# Patient Record
Sex: MALE | Race: WHITE | NOT HISPANIC OR LATINO | Employment: STUDENT | URBAN - METROPOLITAN AREA
[De-identification: names, ages, dates, MRNs, and addresses within clinical notes are randomized per-mention and may not be internally consistent; named-entity substitution may affect disease eponyms.]

---
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
## 2024-01-26 ENCOUNTER — TELEPHONE (OUTPATIENT)
Dept: FAMILY MEDICINE CLINIC | Facility: CLINIC | Age: 19
End: 2024-01-26

## 2024-01-26 NOTE — TELEPHONE ENCOUNTER
Pt contacted. Was unable to get today appt. Told pt we are open on Saturday 9-12 acute care if he was interested. If not to go to [facility] or er

## 2024-01-26 NOTE — TELEPHONE ENCOUNTER
Patient was calling today because he was having cough, drainage and sinus pressure.  I informed him that unfortunately we had no openings as of right now and advised him that he may need to go to [facility] to be evaluated.  If he can be worked in he would appreciate it but I told him there were no guarantees .

## 2024-03-09 ENCOUNTER — OFFICE VISIT (OUTPATIENT)
Dept: FAMILY MEDICINE CLINIC | Facility: CLINIC | Age: 19
End: 2024-03-09
Payer: COMMERCIAL

## 2024-03-09 VITALS
OXYGEN SATURATION: 99 % | SYSTOLIC BLOOD PRESSURE: 110 MMHG | HEART RATE: 72 BPM | WEIGHT: 154 LBS | DIASTOLIC BLOOD PRESSURE: 74 MMHG | BODY MASS INDEX: 22.05 KG/M2 | HEIGHT: 70 IN

## 2024-03-09 DIAGNOSIS — J40 BRONCHITIS: Primary | ICD-10-CM

## 2024-03-09 PROCEDURE — 99213 OFFICE O/P EST LOW 20 MIN: CPT | Performed by: STUDENT IN AN ORGANIZED HEALTH CARE EDUCATION/TRAINING PROGRAM

## 2024-03-09 RX ORDER — AMOXICILLIN 875 MG/1
875 TABLET, COATED ORAL 2 TIMES DAILY
Qty: 14 TABLET | Refills: 0 | Status: SHIPPED | OUTPATIENT
Start: 2024-03-09 | End: 2024-03-16

## 2024-03-09 RX ORDER — DEXTROMETHORPHAN HYDROBROMIDE AND PROMETHAZINE HYDROCHLORIDE 15; 6.25 MG/5ML; MG/5ML
5 SYRUP ORAL 4 TIMES DAILY PRN
Qty: 240 ML | Refills: 0 | Status: SHIPPED | OUTPATIENT
Start: 2024-03-09

## 2024-03-09 NOTE — PROGRESS NOTES
"Chief Complaint  Cough (Pt reports cough x 1 week with yellowish green sputum. )    Subjective          Piero Barlow presents to [facility]  Cough  This is a new problem. The current episode started 1 to 4 weeks ago. The problem has been worse. The problem occurs constantly. The cough is Productive of sputum. Associated symptoms include headaches, nasal congestion, postnasal drip and rhinorrhea. Pertinent negatives include no ear congestion, fever, sore throat or shortness of breath. Nothing aggravates the symptoms. He has tried OTC cough suppressant for the symptoms. The treatment provided mild relief. His past medical history is significant for environmental allergies.       Objective   Vital Signs:   /74   Pulse 72   Ht 177.8 cm (70\")   Wt 69.9 kg (154 lb)   SpO2 99%   BMI 22.10 kg/m²     Body mass index is 22.1 kg/m².    Review of Systems   Constitutional:  Negative for fever.   HENT:  Positive for postnasal drip and rhinorrhea. Negative for sore throat.    Respiratory:  Positive for cough. Negative for shortness of breath.    Allergic/Immunologic: Positive for environmental allergies.       Past History:  Medical History: has no past medical history on file.   Surgical History: has no past surgical history on file.   Family History: family history is not on file.   Social History: reports that he has never smoked. He has never used smokeless tobacco. He reports that he does not drink alcohol and does not use drugs.      Current Outpatient Medications:     fluticasone (FLONASE) 50 MCG/ACT nasal spray, Instill 2 sprays into the nostril(s) as directed by provider Daily., Disp: 16 mL, Rfl: 3    amoxicillin (AMOXIL) 875 MG tablet, Take 1 tablet by mouth 2 (Two) Times a Day for 7 days., Disp: 14 tablet, Rfl: 0    promethazine-dextromethorphan (PROMETHAZINE-DM) 6.25-15 MG/5ML syrup, Take 5 mL by mouth 4 (Four) Times a Day As Needed for Cough., Disp: 240 mL, Rfl: " 0    Allergies: Patient has no known allergies.    Physical Exam  Constitutional:       General: He is not in acute distress.     Appearance: He is not ill-appearing or toxic-appearing.   HENT:      Head: Normocephalic and atraumatic.      Right Ear: Ear canal and external ear normal.      Left Ear: Ear canal and external ear normal.      Nose: Congestion and rhinorrhea present.      Mouth/Throat:      Pharynx: Posterior oropharyngeal erythema (cobblestoning) present.   Eyes:      General: No scleral icterus.  Cardiovascular:      Rate and Rhythm: Normal rate and regular rhythm.   Pulmonary:      Effort: Pulmonary effort is normal.      Breath sounds: Normal breath sounds.   Neurological:      Mental Status: He is alert.        Result Review :                   Assessment and Plan    Diagnoses and all orders for this visit:    1. Bronchitis (Primary)    Other orders  -     amoxicillin (AMOXIL) 875 MG tablet; Take 1 tablet by mouth 2 (Two) Times a Day for 7 days.  Dispense: 14 tablet; Refill: 0  -     promethazine-dextromethorphan (PROMETHAZINE-DM) 6.25-15 MG/5ML syrup; Take 5 mL by mouth 4 (Four) Times a Day As Needed for Cough.  Dispense: 240 mL; Refill: 0    Will treat with Amoxicillin and Cough meds. Tylenol/Motrin for pain/fever. OTC cough and cold medication for symptoms. Nasal saline spray recommended. Cool mist humidifier at the bedside. RTC with new or worsening symptoms.      Follow Up   No follow-ups on file.  Patient was given instructions and counseling regarding his condition or for health maintenance advice. Please see specific information pulled into the AVS if appropriate.     Lee Ann Tijerina,

## 2024-04-10 ENCOUNTER — TELEPHONE (OUTPATIENT)
Dept: FAMILY MEDICINE CLINIC | Facility: CLINIC | Age: 19
End: 2024-04-10
Payer: COMMERCIAL

## 2024-04-10 ENCOUNTER — HOSPITAL ENCOUNTER (EMERGENCY)
Facility: HOSPITAL | Age: 19
Discharge: HOME | End: 2024-04-10
Attending: EMERGENCY MEDICINE | Admitting: EMERGENCY MEDICINE
Payer: COMMERCIAL

## 2024-04-10 ENCOUNTER — APPOINTMENT (OUTPATIENT)
Dept: CT IMAGING | Facility: HOSPITAL | Age: 19
End: 2024-04-10
Payer: COMMERCIAL

## 2024-04-10 VITALS
SYSTOLIC BLOOD PRESSURE: 118 MMHG | WEIGHT: 155 LBS | HEART RATE: 57 BPM | TEMPERATURE: 98.1 F | RESPIRATION RATE: 16 BRPM | OXYGEN SATURATION: 100 % | HEIGHT: 69 IN | DIASTOLIC BLOOD PRESSURE: 82 MMHG | BODY MASS INDEX: 22.96 KG/M2

## 2024-04-10 DIAGNOSIS — S06.0X0A CONCUSSION WITHOUT LOSS OF CONSCIOUSNESS, INITIAL ENCOUNTER: Primary | ICD-10-CM

## 2024-04-10 DIAGNOSIS — S09.90XA INJURY OF HEAD, INITIAL ENCOUNTER: ICD-10-CM
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
## 2024-04-10 PROCEDURE — 70450 CT HEAD/BRAIN W/O DYE: CPT

## 2024-04-10 PROCEDURE — 99284 EMERGENCY DEPT VISIT MOD MDM: CPT
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
## 2024-04-15 ENCOUNTER — OFFICE VISIT (OUTPATIENT)
Dept: FAMILY MEDICINE CLINIC | Facility: CLINIC | Age: 19
End: 2024-04-15
Payer: COMMERCIAL

## 2024-04-15 VITALS
DIASTOLIC BLOOD PRESSURE: 68 MMHG | SYSTOLIC BLOOD PRESSURE: 104 MMHG | BODY MASS INDEX: 23.11 KG/M2 | HEIGHT: 69 IN | OXYGEN SATURATION: 99 % | WEIGHT: 156 LBS | HEART RATE: 58 BPM
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
## 2024-04-15 DIAGNOSIS — S06.0XAD CONCUSSION WITH UNKNOWN LOSS OF CONSCIOUSNESS STATUS, SUBSEQUENT ENCOUNTER: Primary | ICD-10-CM
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
## 2024-04-19 ENCOUNTER — DOCUMENTATION (OUTPATIENT)
Dept: FAMILY MEDICINE CLINIC | Facility: CLINIC | Age: 19
End: 2024-04-19
Payer: COMMERCIAL

## 2024-04-19 NOTE — PROGRESS NOTES
Patient stopped by [facility] today.  He states that he has been feeling much better.  He states that he has had no further nausea or tiredness.  He states that he still has some fatigue but notes it is his normal college fatigue.  He states that he has been back to class as well as back to getting his homework done with no increase in symptoms.  He has had 1 headache that was really in the back of his head the other night but otherwise has been doing fine.  He states that he does have concussion forms to fill out daily but has been forgetting to do them.  He has not been back to track practice just yet.  Advised patient to continue current remember to be easy with himself this week.  Will worry about resuming track practice until next week.  If he has any return of symptoms he is to let our office know immediately.

## 2024-08-09 ENCOUNTER — OFFICE VISIT (OUTPATIENT)
Dept: FAMILY MEDICINE CLINIC | Facility: CLINIC | Age: 19
End: 2024-08-09
Payer: COMMERCIAL

## 2024-08-09 VITALS
WEIGHT: 156 LBS | DIASTOLIC BLOOD PRESSURE: 76 MMHG | OXYGEN SATURATION: 99 % | HEART RATE: 58 BPM | BODY MASS INDEX: 23.11 KG/M2 | SYSTOLIC BLOOD PRESSURE: 132 MMHG | HEIGHT: 69 IN

## 2024-08-09 DIAGNOSIS — Z11.59 NEED FOR HEPATITIS C SCREENING TEST: ICD-10-CM

## 2024-08-09 DIAGNOSIS — Z00.00 ANNUAL PHYSICAL EXAM: Primary | ICD-10-CM

## 2024-08-09 PROCEDURE — 99395 PREV VISIT EST AGE 18-39: CPT | Performed by: PHYSICIAN ASSISTANT

## 2024-08-09 NOTE — PROGRESS NOTES
"Chief Complaint  Annual Exam    Subjective          Piero Balrow presents to [facility] for   History of Present Illness  .  History of Present Illness  The patient presents for a physical examination for [employer] student athlete form    He forgot to complete the necessary physical form for his school. He experienced hip pain at work, which he suspects might be a hip flexor strain. The pain was so severe that he could barely raise his leg. After a week of rest, the pain subsided, and he no longer experiences discomfort while walking. He has taken ibuprofen a few times. He has no history of asthma. He has a dermatology appointment scheduled for next Monday to address his acne.        Objective   Vital Signs:   Vitals:    08/09/24 1447   BP: 132/76   Pulse: 58   SpO2: 99%   Weight: 70.8 kg (156 lb)   Height: 175.3 cm (69\")     Body mass index is 23.04 kg/m².  BMI is within normal parameters. No other follow-up for BMI required.      History reviewed. No pertinent family history.    Social History     Tobacco Use   • Smoking status: Never   • Smokeless tobacco: Never   Substance Use Topics   • Alcohol use: Never       History reviewed. No pertinent surgical history.    Review of Systems      Physical Exam  Vitals and nursing note reviewed.   Constitutional:       General: He is not in acute distress.     Appearance: Normal appearance. He is normal weight. He is not ill-appearing or toxic-appearing.   HENT:      Head: Normocephalic and atraumatic.      Right Ear: Tympanic membrane, ear canal and external ear normal.      Left Ear: Tympanic membrane, ear canal and external ear normal.      Nose: Nose normal.      Mouth/Throat:      Mouth: Mucous membranes are moist.   Eyes:      General: No scleral icterus.     Extraocular Movements: Extraocular movements intact.      Pupils: Pupils are equal, round, and reactive to light.   Cardiovascular:      Rate and Rhythm: Normal rate and " regular rhythm.      Heart sounds: Normal heart sounds.   Pulmonary:      Effort: Pulmonary effort is normal.      Breath sounds: Normal breath sounds.   Abdominal:      General: Abdomen is flat.   Musculoskeletal:         General: Normal range of motion.      Cervical back: Normal range of motion.   Skin:     General: Skin is warm.   Neurological:      General: No focal deficit present.      Mental Status: He is alert.      Cranial Nerves: No cranial nerve deficit.      Motor: No weakness.      Coordination: Coordination normal.      Gait: Gait normal.      Deep Tendon Reflexes: Reflexes normal.   Psychiatric:         Mood and Affect: Mood normal.         Behavior: Behavior normal.      Result Review :                Immunization History   Administered Date(s) Administered   • Covid-19 (Pfizer) Gray Cap Monovalent 08/10/2022, 10/05/2022   • DTaP, Unspecified 06/18/2008   • Hep A, 2 Dose 06/29/2012   • Hpv9 08/10/2022   • IPV 06/18/2008   • Influenza Quad Vaccine (Inpatient) 10/17/2007, 10/30/2008, 09/12/2009, 10/08/2012   • Influenza, Unspecified 10/18/2017   • MMR 06/18/2008   • Meningococcal Conjugate 08/10/2022   • Meningococcal MCV4P (Menactra) 10/26/2015   • PEDS-Pneumococcal Conjugate (PCV7) 06/14/2005   • Tdap 10/26/2015   • Varicella 06/14/2005, 06/18/2008, 06/29/2012       Health Maintenance   Topic Date Due   • HPV VACCINES (2 - Male 3-dose series) 09/07/2022   • HEPATITIS C SCREENING  Never done   • ANNUAL PHYSICAL  Never done   • COVID-19 Vaccine (3 - 2023-24 season) 09/01/2023   • INFLUENZA VACCINE  08/01/2024   • TDAP/TD VACCINES (2 - Td or Tdap) 10/26/2025   • MENINGOCOCCAL VACCINE  Completed   • Pneumococcal Vaccine 0-64  Aged Out         .  Assessment & Plan  1. Annual physical examination.  His blood pressure readings are within the normal range. Blood work will be conducted to rule out anemia. He is advised to bring his physical form to the next visit for completion.         Assessment and Plan     Diagnoses and all orders for this visit:    1. Annual physical exam (Primary)    2. Need for hepatitis C screening test        Counseling/anticipatory guidance: Nutrition, physical activity, healthy weight, dental health, mental health, eye exam, immunizations, screenings      Follow Up   No follow-ups on file.  Patient was given instructions and counseling regarding his condition or for health maintenance advice. Please see specific information pulled into the AVS if appropriate.

## 2024-08-10 LAB
25(OH)D3+25(OH)D2 SERPL-MCNC: 93.7 NG/ML (ref 30–100)
ALBUMIN SERPL-MCNC: 4.9 G/DL (ref 4.3–5.2)
ALP SERPL-CCNC: 84 IU/L (ref 51–125)
ALT SERPL-CCNC: 14 IU/L (ref 0–44)
AST SERPL-CCNC: 19 IU/L (ref 0–40)
BASOPHILS # BLD AUTO: 0.1 X10E3/UL (ref 0–0.2)
BASOPHILS NFR BLD AUTO: 1 %
BILIRUB SERPL-MCNC: 0.5 MG/DL (ref 0–1.2)
BUN SERPL-MCNC: 17 MG/DL (ref 6–20)
BUN/CREAT SERPL: 19 (ref 9–20)
CALCIUM SERPL-MCNC: 9.7 MG/DL (ref 8.7–10.2)
CHLORIDE SERPL-SCNC: 102 MMOL/L (ref 96–106)
CO2 SERPL-SCNC: 23 MMOL/L (ref 20–29)
CREAT SERPL-MCNC: 0.9 MG/DL (ref 0.76–1.27)
EGFRCR SERPLBLD CKD-EPI 2021: 125 ML/MIN/1.73
EOSINOPHIL # BLD AUTO: 0.1 X10E3/UL (ref 0–0.4)
EOSINOPHIL NFR BLD AUTO: 1 %
ERYTHROCYTE [DISTWIDTH] IN BLOOD BY AUTOMATED COUNT: 12.8 % (ref 11.6–15.4)
GLOBULIN SER CALC-MCNC: 2.2 G/DL (ref 1.5–4.5)
GLUCOSE SERPL-MCNC: 85 MG/DL (ref 70–99)
HCT VFR BLD AUTO: 45.7 % (ref 37.5–51)
HGB BLD-MCNC: 14.9 G/DL (ref 13–17.7)
IMM GRANULOCYTES # BLD AUTO: 0 X10E3/UL (ref 0–0.1)
IMM GRANULOCYTES NFR BLD AUTO: 0 %
LYMPHOCYTES # BLD AUTO: 2.3 X10E3/UL (ref 0.7–3.1)
LYMPHOCYTES NFR BLD AUTO: 31 %
MCH RBC QN AUTO: 29.9 PG (ref 26.6–33)
MCHC RBC AUTO-ENTMCNC: 32.6 G/DL (ref 31.5–35.7)
MCV RBC AUTO: 92 FL (ref 79–97)
MONOCYTES # BLD AUTO: 0.4 X10E3/UL (ref 0.1–0.9)
MONOCYTES NFR BLD AUTO: 5 %
NEUTROPHILS # BLD AUTO: 4.6 X10E3/UL (ref 1.4–7)
NEUTROPHILS NFR BLD AUTO: 62 %
PLATELET # BLD AUTO: 211 X10E3/UL (ref 150–450)
POTASSIUM SERPL-SCNC: 4.1 MMOL/L (ref 3.5–5.2)
PROT SERPL-MCNC: 7.1 G/DL (ref 6–8.5)
RBC # BLD AUTO: 4.98 X10E6/UL (ref 4.14–5.8)
SODIUM SERPL-SCNC: 140 MMOL/L (ref 134–144)
T4 FREE SERPL-MCNC: 1.34 NG/DL (ref 0.82–1.77)
TSH SERPL DL<=0.005 MIU/L-ACNC: 1.21 UIU/ML (ref 0.45–4.5)
VIT B12 SERPL-MCNC: 734 PG/ML (ref 232–1245)
WBC # BLD AUTO: 7.4 X10E3/UL (ref 3.4–10.8)

## 2024-08-12 ENCOUNTER — TELEPHONE (OUTPATIENT)
Dept: FAMILY MEDICINE CLINIC | Facility: CLINIC | Age: 19
End: 2024-08-12
Payer: COMMERCIAL

## 2024-08-12 NOTE — TELEPHONE ENCOUNTER
Name: Piero Barlow    Relationship: Self    Best Callback Number: [phone]    HUB PROVIDED THE RELAY MESSAGE FROM THE OFFICE   PATIENT VOICED UNDERSTANDING AND HAS NO FURTHER QUESTIONS AT THIS TIME    ADDITIONAL INFORMATION:

## 2024-08-12 NOTE — PROGRESS NOTES
Please call patient and let him know that his kidney and liver function as well as electrolytes are normal.  His B12 and his vitamin D levels are also normal.  His thyroid function is normal his blood count is also normal.  I do not have his hepatitis  C back just yet but we will call him when I see that come through my inbox.

## 2024-08-12 NOTE — TELEPHONE ENCOUNTER
HUB TO RELAY: [facility]  for pt to call back.   ----- Message from Samia Yap sent at 8/12/2024  2:31 PM EDT -----  Please call patient and let him know that his kidney and liver function as well as electrolytes are normal.  His B12 and his vitamin D levels are also normal.  His thyroid function is normal his blood count is also normal.  I do not have his hepatiti  s  C back just yet but we will call him when I see that come through my inbox.

## 2024-08-14 ENCOUNTER — TELEPHONE (OUTPATIENT)
Dept: FAMILY MEDICINE CLINIC | Facility: CLINIC | Age: 19
End: 2024-08-14
Payer: COMMERCIAL
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
## 2024-08-15 NOTE — TELEPHONE ENCOUNTER
Please call patient and let him know I've filled it out and he can  or we can fax- whichever is easiest for him

## 2024-09-11 ENCOUNTER — OFFICE VISIT (OUTPATIENT)
Dept: FAMILY MEDICINE CLINIC | Facility: CLINIC | Age: 19
End: 2024-09-11

## 2024-09-11 VITALS
OXYGEN SATURATION: 98 % | DIASTOLIC BLOOD PRESSURE: 72 MMHG | HEIGHT: 69 IN | SYSTOLIC BLOOD PRESSURE: 124 MMHG | HEART RATE: 58 BPM | WEIGHT: 150 LBS | BODY MASS INDEX: 22.22 KG/M2

## 2024-09-11 DIAGNOSIS — T73.3XXA FATIGUE DUE TO EXCESSIVE EXERTION, INITIAL ENCOUNTER: Primary | ICD-10-CM

## 2024-09-11 PROCEDURE — 99213 OFFICE O/P EST LOW 20 MIN: CPT | Performed by: PHYSICIAN ASSISTANT

## 2024-09-11 NOTE — PROGRESS NOTES
".Chief Complaint  Nausea and Fatigue (Started a few days ago )    Subjective          History of Present Illness  Piero Barlow is here today with his  History of Present Illness  The patient presents for evaluation of nausea and motion sickness.  Patient states that over this past weekend he was at a tournament in [place].  They returned at 2 AM in the morning on Sunday with a practice at 9 AM that morning.  Since that time he has been quite tired and fatigued.  He states that he was nauseous in the morning yesterday when he woke.  He states he still had some nausea this morning but less than before.  He has had no vomiting or diarrhea.  He denies any runny nose congestion or cough.  He states that he got a good night sleep last night and has been feeling better today.        Objective   Vital Signs:   /72   Pulse 58   Ht 175.3 cm (69\")   Wt 68 kg (150 lb)   SpO2 98%   BMI 22.15 kg/m²     Body mass index is 22.15 kg/m².  BMI is within normal parameters. No other follow-up for BMI required.      Review of Systems      Current Outpatient Medications:   •  clindamycin 1 % gel, Apply 1 Application topically to the appropriate area as directed Every Morning., Disp: 60 g, Rfl: 2  •  fluticasone (FLONASE) 50 MCG/ACT nasal spray, Instill 2 sprays into the nostril(s) as directed by provider Daily., Disp: 16 mL, Rfl: 3  •  minocycline (MINOCIN,DYNACIN) 100 MG capsule, Take 1 capsule by mouth 2 (Two) Times a Day., Disp: 60 capsule, Rfl: 2  •  tretinoin (RETIN-A) 0.05 % cream, Apply pea-sized amount topically to the appropriate area as directed Every Night., Disp: 45 g, Rfl: 1  •  promethazine-dextromethorphan (PROMETHAZINE-DM) 6.25-15 MG/5ML syrup, Take 5 mL by mouth 4 (Four) Times a Day As Needed for Cough. (Patient not taking: Reported on 8/9/2024), Disp: 240 mL, Rfl: 0    Allergies: Patient has no known allergies.    Physical Exam  Vitals and nursing note reviewed.   Constitutional:       General: He is not in " acute distress.     Appearance: Normal appearance. He is normal weight. He is not ill-appearing, toxic-appearing or diaphoretic.   HENT:      Head: Normocephalic and atraumatic.      Right Ear: Tympanic membrane, ear canal and external ear normal.      Left Ear: Tympanic membrane, ear canal and external ear normal.      Nose: Nose normal.      Mouth/Throat:      Mouth: Mucous membranes are moist.   Eyes:      Pupils: Pupils are equal, round, and reactive to light.   Cardiovascular:      Rate and Rhythm: Normal rate and regular rhythm.      Heart sounds: Normal heart sounds.   Pulmonary:      Effort: Pulmonary effort is normal.      Breath sounds: Normal breath sounds.   Neurological:      General: No focal deficit present.      Mental Status: He is alert.   Psychiatric:         Mood and Affect: Mood normal.         Behavior: Behavior normal.      Physical Exam      Result Review :          Results  Laboratory Studies  Covid, flu, and strep tests are all negative.             Assessment and Plan    Diagnoses and all orders for this visit:    1. Fatigue due to excessive exertion, initial encounter (Primary)    He reported feeling extremely tired, likely due to a combination of early wake-up times and a busy schedule, including late-night activities and early morning practices. He is advised to prioritize sleep and rest to alleviate symptoms of exhaustion.  His COVID flu and strep test today are negative.  Assessment & Plan          Follow Up   No follow-ups on file.  Patient was given instructions and counseling regarding his condition or for health maintenance advice. Please see specific information pulled into the AVS if appropriate.     Patient or patient representative verbalized consent for the use of Ambient Listening during the visit with  SHELLEY Funez for chart documentation. 9/11/2024  14:13 EDT    SHELLEY Funez  09/11/2024

## 2025-07-07 ENCOUNTER — OFFICE VISIT (OUTPATIENT)
Dept: FAMILY MEDICINE CLINIC | Facility: CLINIC | Age: 20
End: 2025-07-07
Payer: COMMERCIAL

## 2025-07-07 VITALS
OXYGEN SATURATION: 97 % | DIASTOLIC BLOOD PRESSURE: 70 MMHG | HEART RATE: 57 BPM | WEIGHT: 154 LBS | HEIGHT: 69 IN | BODY MASS INDEX: 22.81 KG/M2 | SYSTOLIC BLOOD PRESSURE: 122 MMHG

## 2025-07-07 DIAGNOSIS — M25.561 CHRONIC PAIN OF BOTH KNEES: ICD-10-CM

## 2025-07-07 DIAGNOSIS — G89.29 CHRONIC PAIN OF BOTH KNEES: ICD-10-CM

## 2025-07-07 DIAGNOSIS — L70.0 ACNE VULGARIS: Primary | ICD-10-CM

## 2025-07-07 DIAGNOSIS — M25.562 CHRONIC PAIN OF BOTH KNEES: ICD-10-CM

## 2025-07-07 DIAGNOSIS — M25.552 BILATERAL HIP PAIN: ICD-10-CM

## 2025-07-07 DIAGNOSIS — M25.551 BILATERAL HIP PAIN: ICD-10-CM

## 2025-07-07 PROCEDURE — 99214 OFFICE O/P EST MOD 30 MIN: CPT | Performed by: PHYSICIAN ASSISTANT

## 2025-07-07 RX ORDER — MINOCYCLINE HYDROCHLORIDE 100 MG/1
100 CAPSULE ORAL 2 TIMES DAILY
Qty: 60 CAPSULE | Refills: 2 | Status: SHIPPED | OUTPATIENT
Start: 2025-07-07

## 2025-07-07 NOTE — PROGRESS NOTES
.Chief Complaint  Pain (Leg pain, all over with small amounts of activity over the last year ) and Rash (Blood blisters started on back, and has now moved from back to neck and jaw line since December )    Subjective          History of Present Illness  Piero Barlow is here today with his  History of Present Illness  The patient presents for evaluation of acne and muscle pain.    He reports a history of spots on his back and spine, which he describes as blood pockets that have since scarred over. Recently, similar spots have appeared on his neck and jawline. These spots occasionally fill with blood or a dark brown liquid and burst spontaneously, causing pain. He does not attempt to open them himself. Currently, he has no active spots but recalls one bursting while washing his face recently. His girlfriend has seen these spots, but he has not sought medical attention for them. He first noticed these facial spots after visiting a dermatologist last fall for acne who prescribed an antibiotic and facial cream. After completing the antibiotic course and using the cream, he began to develop breakouts on his neck and jaw, areas where he had not previously experienced acne. His acne was previously limited to his cheeks and forehead. He continues to use benzoyl peroxide for facial cleansing and applies moisturizer. He has discontinued the use of clindamycin gel and tretinoin due to severe skin dryness. He also mentions a recent incident where a hit to his chin caused shooting pain due to a cyst under his chin.    He is an active runner but has been unable to train effectively due to recurring issues. He reports a torn hip flexor last year,  as well as shin splints. He experienced numbness and swelling in his lower leg, without visible signs, and felt pressure building up in his shins. This resolved after a few months of rest. He also mentions a pulled back muscle. Currently, he is experiencing joint pain in his knee and  "ankles, which is not related to running. His running distance has decreased from 10 to 15 miles in 2 days to the same distance over 3 to 4 weeks. He experiences random aches and pains in his muscles or joints, which can last for a couple of days. He believes his current soreness is due to resuming training with biking.  He sees a physical therapist in [place] when he has issues he can not resolve on his own. He has an appointment with him on Wednesday at 9:00 AM due to knee pain.  Most of his injuries are sports-related. He does not lift weights and tries to avoid painkillers or anti-inflammatory medications to stay aware of his body's condition. However, he will take them before exercise if necessary to reduce pain. Hw does not go to the PT as often as he should due to the high Copay involved.     FAMILY HISTORY  His grandpa had arthritis.    Objective   Vital Signs:   /70   Pulse 57   Ht 175.3 cm (69\")   Wt 69.9 kg (154 lb)   SpO2 97%   BMI 22.74 kg/m²     Body mass index is 22.74 kg/m².  BMI is within normal parameters. No other follow-up for BMI required.  Grecia michael prev and not going back to him    Review of Systems      Current Outpatient Medications:   •  Cetirizine HCl 10 MG/ML solution, Take 10 mg by mouth Daily., Disp: , Rfl:   •  fluticasone (FLONASE) 50 MCG/ACT nasal spray, Instill 2 sprays into the nostril(s) as directed by provider Daily., Disp: 16 mL, Rfl: 3  •  minocycline (Minocin) 100 MG capsule, Take 1 capsule by mouth 2 (Two) Times a Day., Disp: 60 capsule, Rfl: 2    Allergies: Patient has no known allergies.    Physical Exam  Vitals and nursing note reviewed.   Constitutional:       General: He is not in acute distress.     Appearance: Normal appearance. He is normal weight. He is not ill-appearing, toxic-appearing or diaphoretic.   HENT:      Head: Normocephalic and atraumatic.   Cardiovascular:      Rate and Rhythm: Normal rate and regular rhythm.      Heart sounds: Normal heart " sounds.   Pulmonary:      Effort: Pulmonary effort is normal.      Breath sounds: Normal breath sounds.   Skin:     Comments: Cystic acne to face and neck   Neurological:      General: No focal deficit present.      Mental Status: He is alert.   Psychiatric:         Mood and Affect: Mood normal.         Behavior: Behavior normal.      Physical Exam  Musculoskeletal: Pain in knee joints and ankles, tenderness in hip joint, limited mobility due to pain in upper hamstring.  Skin: Presence of cystic acne on neck and jawline.    Result Review :          Results               Assessment and Plan    Diagnoses and all orders for this visit:    1. Acne vulgaris (Primary)  -     Ambulatory Referral to Dermatology  -     minocycline (Minocin) 100 MG capsule; Take 1 capsule by mouth 2 (Two) Times a Day.  Dispense: 60 capsule; Refill: 2  - Symptoms suggest a possible link to acne rather than blood blisters.  - Previous use of tretinoin caused significant skin dryness; currently using benzoyl peroxide and moisturizer.  - Referral to a dermatologist will be made for further evaluation.  - Antibiotic prescription sent to pharmacy for daily use to manage potential cystic acne.    2. Bilateral hip pain  -     Ambulatory Referral to Physical Therapy for Evaluation & Treatment    3. Chronic pain of both knees  -     Ambulatory Referral to Physical Therapy for Evaluation & Treatment  - Muscle pain appears related to running activities rather than underlying mobility or joint issues.  - Physical exam findings indicate no hypermobility; patient experiences random aches and pains.  - Referral for physical therapy will be made to address knee and hip joint pain.  - Over-the-counter ibuprofen recommended, with a dosage of 2 to 3 tablets daily, either before or after exercise, to help reduce inflammation.    Assessment & Plan        Follow Up   No follow-ups on file.  Patient was given instructions and counseling regarding his condition or  for health maintenance advice. Please see specific information pulled into the AVS if appropriate.     Patient or patient representative verbalized consent for the use of Ambient Listening during the visit with  SHELLEY Funez for chart documentation. 7/9/2025  10:44 EDT    SHELLEY Funez  07/07/2025